# Patient Record
Sex: FEMALE
[De-identification: names, ages, dates, MRNs, and addresses within clinical notes are randomized per-mention and may not be internally consistent; named-entity substitution may affect disease eponyms.]

---

## 2019-03-04 PROBLEM — Z00.00 ENCOUNTER FOR PREVENTIVE HEALTH EXAMINATION: Status: ACTIVE | Noted: 2019-03-04

## 2019-03-05 ENCOUNTER — APPOINTMENT (OUTPATIENT)
Dept: HUMAN REPRODUCTION | Facility: CLINIC | Age: 39
End: 2019-03-05

## 2019-07-10 ENCOUNTER — APPOINTMENT (OUTPATIENT)
Dept: ORTHOPEDIC SURGERY | Facility: CLINIC | Age: 39
End: 2019-07-10
Payer: COMMERCIAL

## 2019-07-10 DIAGNOSIS — M94.269 CHONDROMALACIA, UNSPECIFIED KNEE: ICD-10-CM

## 2019-07-10 PROCEDURE — 73562 X-RAY EXAM OF KNEE 3: CPT | Mod: RT

## 2019-07-10 PROCEDURE — 99204 OFFICE O/P NEW MOD 45 MIN: CPT

## 2019-07-11 NOTE — ASSESSMENT
[FreeTextEntry1] : Patient given home exericses.  Offered PT but patient declines at this time.  If no improvement in 6 weeks, patient to follow-up in office.

## 2019-07-11 NOTE — HISTORY OF PRESENT ILLNESS
[de-identified] : Patient is presenting with over one year of right knee mild soreness with activity and flexion.  States no specific fall or trauma.  Denies instability.  Denies swelling.  Reports a subtle intermittent buckling sensation.

## 2019-07-11 NOTE — PHYSICAL EXAM
[de-identified] : Right knee\par \par Constitutional: \par The patient is healthy-appearing and in no apparent distress. \par \par Gait:\par The patient ambulates with a normal gait and no limp.\par \par Cardiovascular System: \par There is capillary refill less than 2 seconds. \par \par \par Skin: \par There is no skin abnormalities.\par \par Right Knee: \par Bony Palpation: \par There is no tenderness of the medial or lateral joint line, the medial of lateral femoral chondyle, tibial tubercle, superior or inferior patella.\par There is tenderness to the medial and lateral patellar facets.\par \par Soft Tissue Palpation: \par There is no tenderness of the medial and lateral retinaculum, quadriceps tendon, patella tendon, ITB or pes anserine.\par \par Active Range of Motion: \par There is full range of motion at the knee actively and passively. \par \par Special Tests: \par There is a negative Apley and Steinmanns.  There is a negative Lachman, Anterior Drawer, and Posterior Drawer.  There is no varus or valgus laxity.\par \par Strength: \par There is 4/5 hip flexion and 5/5 knee flexion and extension.  \par \par Psychiatric: \par The patient demonstrates a normal mood and affect and is active and alert\par Alignment:\par There is external tibial rotation and femoral anteversion. [de-identified] : RIGHT knee x-ray: There is no significant arthritis, bony abnormality or fracture